# Patient Record
Sex: FEMALE | Race: ASIAN
[De-identification: names, ages, dates, MRNs, and addresses within clinical notes are randomized per-mention and may not be internally consistent; named-entity substitution may affect disease eponyms.]

---

## 2022-12-03 ENCOUNTER — HOSPITAL ENCOUNTER (EMERGENCY)
Dept: HOSPITAL 54 - ER | Age: 32
Discharge: LEFT BEFORE DISCHARGE | End: 2022-12-03
Payer: MEDICAID

## 2022-12-03 VITALS — HEIGHT: 61 IN | WEIGHT: 134 LBS | BODY MASS INDEX: 25.3 KG/M2

## 2022-12-03 VITALS — DIASTOLIC BLOOD PRESSURE: 78 MMHG | SYSTOLIC BLOOD PRESSURE: 142 MMHG
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
## 2022-12-03 DIAGNOSIS — S09.90XA: Primary | ICD-10-CM
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
## 2022-12-03 DIAGNOSIS — E11.9: ICD-10-CM
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
## 2022-12-03 DIAGNOSIS — Z60.2: ICD-10-CM

## 2022-12-03 SDOH — SOCIAL STABILITY - SOCIAL INSECURITY: PROBLEMS RELATED TO LIVING ALONE: Z60.2

## 2022-12-03 NOTE — NUR
Patient does not wish to proceed with medical care recommended by Dr. Villanueva. 
Patient given information related to possible complications, up to and 
including death, which could occur as a result of leaving the hospital at this 
time. Patient verbalizes understanding of risks involved due to leaving against 
medical advice. Patient has signed AMA form.
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
